# Patient Record
Sex: MALE | Race: BLACK OR AFRICAN AMERICAN
[De-identification: names, ages, dates, MRNs, and addresses within clinical notes are randomized per-mention and may not be internally consistent; named-entity substitution may affect disease eponyms.]

---

## 2015-02-19 VITALS
SYSTOLIC BLOOD PRESSURE: 124 MMHG | DIASTOLIC BLOOD PRESSURE: 62 MMHG | SYSTOLIC BLOOD PRESSURE: 124 MMHG | DIASTOLIC BLOOD PRESSURE: 62 MMHG | DIASTOLIC BLOOD PRESSURE: 62 MMHG | SYSTOLIC BLOOD PRESSURE: 124 MMHG | DIASTOLIC BLOOD PRESSURE: 62 MMHG | SYSTOLIC BLOOD PRESSURE: 124 MMHG

## 2018-08-09 ENCOUNTER — HOSPITAL ENCOUNTER (OUTPATIENT)
Dept: HOSPITAL 61 - US | Age: 58
Discharge: HOME | End: 2018-08-09
Attending: FAMILY MEDICINE
Payer: MEDICARE

## 2018-08-09 DIAGNOSIS — I10: ICD-10-CM

## 2018-08-09 DIAGNOSIS — E78.5: ICD-10-CM

## 2018-08-09 DIAGNOSIS — E11.9: ICD-10-CM

## 2018-08-09 DIAGNOSIS — H34.232: ICD-10-CM

## 2018-08-09 DIAGNOSIS — I65.23: Primary | ICD-10-CM

## 2018-08-09 PROCEDURE — 93880 EXTRACRANIAL BILAT STUDY: CPT

## 2018-08-10 ENCOUNTER — HOSPITAL ENCOUNTER (OUTPATIENT)
Dept: HOSPITAL 61 - ECHO | Age: 58
Discharge: HOME | End: 2018-08-10
Attending: INTERNAL MEDICINE
Payer: MEDICARE

## 2018-08-10 DIAGNOSIS — E78.5: ICD-10-CM

## 2018-08-10 DIAGNOSIS — R00.2: Primary | ICD-10-CM

## 2018-08-10 DIAGNOSIS — E11.9: ICD-10-CM

## 2018-08-10 DIAGNOSIS — I10: ICD-10-CM

## 2018-08-10 PROCEDURE — 93306 TTE W/DOPPLER COMPLETE: CPT

## 2019-03-11 ENCOUNTER — HOSPITAL ENCOUNTER (OUTPATIENT)
Dept: HOSPITAL 61 - US | Age: 59
Discharge: HOME | End: 2019-03-11
Attending: INTERNAL MEDICINE
Payer: MEDICAID

## 2019-03-11 DIAGNOSIS — I10: ICD-10-CM

## 2019-03-11 DIAGNOSIS — K76.0: Primary | ICD-10-CM

## 2019-03-11 PROCEDURE — 93975 VASCULAR STUDY: CPT

## 2019-03-11 NOTE — RAD
EXAM: Gray scale and color Doppler renal artery sonogram.

 

HISTORY: Hypertension.

 

TECHNIQUE: Gray scale and color Doppler sonographic images of kidneys and 

renal arteries with spectral analysis was performed.

 

COMPARISON: None.

 

FINDINGS: The right kidney measures 11.1 cm elzl-gi-gezx. The left kidney 

measures 13.2 cm psjg-iz-vsct. There is no hydronephrosis. There is a 5.6 

x 5.2 x 5.0 cm complex cyst within the lower pole the left kidney. This 

measured 4.9 x 4.7 x 4.7 cm on the study dated 2/11/2011.

 

The proximal renal arteries are obscured. The peak systolic velocity 

within the visualized right renal artery is 159 cm/s. The peak systolic 

velocity within the visualized left renal artery is 190 cm/s. There are 

normal renal artery to aorta velocity ratios. There is incidental hepatic 

steatosis.

 

IMPRESSION:

1. Elevated peak systolic velocity within the left renal artery, 

suggesting greater than 60 percent stenosis. There is no Doppler evidence 

of greater than 60 percent stenosis within the right renal artery. The 

evaluation is limited due to obscured proximal renal arteries.

2. 5.6 cm cyst with possible thin internal septation or debris within the 

lower pole the left kidney, slightly increased compared to a maximum 

measurement of 4.9 cm on the study dated 2/11/2011. The slow interval 

change over an 8 year period favors benignity.

 

Electronically signed by: Kyleigh White MD (3/11/2019 4:33 PM) [facility]

## 2021-09-23 ENCOUNTER — HOSPITAL ENCOUNTER (OUTPATIENT)
Dept: HOSPITAL 61 - NM | Age: 61
End: 2021-09-23
Attending: INTERNAL MEDICINE
Payer: MEDICARE

## 2021-09-23 DIAGNOSIS — I25.9: Primary | ICD-10-CM

## 2021-09-23 DIAGNOSIS — I25.10: ICD-10-CM

## 2021-09-23 PROCEDURE — 78452 HT MUSCLE IMAGE SPECT MULT: CPT

## 2021-09-23 PROCEDURE — 93017 CV STRESS TEST TRACING ONLY: CPT

## 2021-09-23 PROCEDURE — A9500 TC99M SESTAMIBI: HCPCS

## 2021-09-23 RX ADMIN — REGADENOSON ONE MG: 0.08 INJECTION, SOLUTION INTRAVENOUS at 09:33

## 2021-09-23 NOTE — RAD
MR#: [id number]

Account#: [id number]

Accession#: [id number]

Date of Study: 09/23/2021

Ordering Physician: TY MALONE, 

Referring Physician: JOSE MARTIN GARCIA Tech: RT Kristina (R) (N)





--------------- APPROVED REPORT --------------





Test Type:          Pharmacological

Stress Nurse/Tech: LELE ALSTON

Test Indications: CAD

Cardiac History: STENTS, HTN- SEE EMR

Medications:     SEE EMR

Medical History: SEE EMR

Resting ECG:     SB

Resting Heart Rate: 42 bpm

Resting Blood Pressure: 144/59mmHg

Pretest Chest Pain: No chest pain



Nurse/Tech Notes

S1,S2, LUNGS CTA, DENIED CHEST PAIN OR SOA. VSS.

Consent: The procedure was explained to the patient in lay terms. Informed consent was witnessed. Elliot
eout was entered into [employer]. History and Stress Test performed by JOSE A Carrillo, MENDEZ (R) 
(N)



Pharm. Details

Pharmacologic stress testing was performed using 0.4mg per 5ml of regadenoson given intravenously ove
r 7-10 seconds.



POST EXERCISE

Reason for Termination: Infusion complete

Max HR: 78 bpm

Max Blood Pressure: 172/65mmHg

Blood Pressure response to exercise: Normal blood pressure response during stress.

Heart Rate response to exercise: WNL

Chest Pain: No. 

Arrhythmia: No. 

ST Change: Yes. 



INTERPRETATION

Stress EKG Conclusion: Baseline EKG shows sinus rhythm.  Mild ST depressions inferolateral leads at p
eak stress suspicious but not diagnostic for ischemia.  No arrhythmias.



Imaging Protocol

IMAGE PROTOCOL: Rest Tc-99m/stress Tc-99m 1 day



Rest:            Stress:         Viability:   

Radiopharm.Tc99m AjutcdtyuOr71f Sestamibi

Dose10.2mCi            30mCi            

Duration    15min.           10min.           

Img Date  09/23/2021 09/23/2021      

Inj-Img [id number].           90min.           



Rest Admin Site:IV - Right AntecubitalAdministrator:JOSE A Carrillo, MENDEZ (R)(N)

Stress Admin Site: IV - Right AntecubitalAdministrator: JOSE A Carrillo, MENDEZ (R)(N)



STRESS DATA

End Diast. Vol.176.0mlAv. Heart Rate54.0bpm

End Syst. Vol.70.0mlCO Index BSA0.0L/min

Myocardial Pkvq771.0gEject. Yqtmuzit68.0%



Stress Rates

Pk. Fill Rate2.21EDV/secLVtime Pk. Fill 291.02msec

Pk. Empty Rate2.81ESV/secLVtime Pk. Rgxle757.09msec

1/3 Pk. Fill0.43EDV/sec



Stress Scores

Regional WT1.00Summed WT12.00

Regional WM0.00Summed WM1.00



LV Perfusion

Scintigraphic images showed moderate reversible defect involving the lateral wall consistent with isc
hemia.



Wall Motion

Normal left ventricle systolic function with ejection fraction calculated at 58%.



LV Perf. Quant

17 Seg. SSS15.00

17 Seg. SRS5.00

17 Seg. SDS10.00

Stress Defect Extent (% LAD)8.10Rest Defect Extent (% LAD)0.00Rev. Defect Extent (% LAD)8.10

Stress Defect Extent (% LCX) 95.00Rest Defect Extent (% LCX)62.50Rev. Defect Extent (% LCX)95.00


Stress Defect Extent (% RCA)0.00Rest Defect Extent (% RCA)0.00Rev. Defect Extent (% RCA)0.00

Stress Defect Extent (% SAVANA)24.60Rest Defect Extent (% SAVANA)10.90Rev. Defect Extent (% SAVANA)24.60



Conclusion

1. Regadenoson cardioisotope stress test showed moderate amount of lateral wall ischemia.

2. Normal left ventricular systolic function with ejection fraction calculated at 58%.

3. Intermediate risk for cardiac events.



Signed by : Azar Bates, 

Electronically Approved : 09/23/2021 16:46:09

## 2022-03-09 ENCOUNTER — HOSPITAL ENCOUNTER (OUTPATIENT)
Dept: HOSPITAL 61 - US | Age: 62
End: 2022-03-09
Attending: INTERNAL MEDICINE
Payer: MEDICARE

## 2022-03-09 DIAGNOSIS — N18.31: Primary | ICD-10-CM

## 2022-03-09 PROCEDURE — 76770 US EXAM ABDO BACK WALL COMP: CPT

## 2022-03-09 NOTE — RAD
EXAM: RENAL ULTRASOUND



CLINICAL HISTORY: Chronic kidney disease.



COMPARISON: 3/11/2019



TECHNIQUE: Ultrasound examination of the bilateral kidneys and urinary bladder was performed.



FINDINGS:



Right kidney: 10.6 cm longitudinal. Increased cortical echogenicity. Cortical thickness is within nor
mal limits. No hydronephrosis.



Left kidney: Measures 13 cm longitudinal. Increased cortical echogenicity. Cortical thickness is with
in normal limits. No hydronephrosis. Redemonstrated hypoechoic cyst at the inferior pole currently me
asured at 5.2 x 5 x 4 cm previously measured at 5.6 x 5.2 x 5 cm. Not seen previously is a complex cy
st at the interpolar region measuring approximately 1.6 x 2.4 x 1.2 cm.



No localized bladder wall thickness or layering debris.



IMPRESSION: 



1. The kidneys are within normal limits for size but exhibit increased cortical echogenicity often se
en with chronic medical renal disease. No hydronephrosis.

2. Complex renal cystic focus at the interpolar left kidney not seen previously and measured at 1.6 x
 1.2 x 2.4 cm. Follow-up ultrasound is recommended in 6 months. A hypoechoic more simple appearing cy
st off the inferior pole measures smaller in size from the prior at 5.2 x 5 x 4 cm compared to 5.6 x 
5.2 x 5 cm.

3. Unremarkable bladder.



Electronically signed by: LILIANE VASQUEZ MD (3/9/2022 5:06 PM) [id number]

## 2022-05-13 ENCOUNTER — HOSPITAL ENCOUNTER (OUTPATIENT)
Dept: HOSPITAL 61 - NM | Age: 62
End: 2022-05-13
Attending: INTERNAL MEDICINE
Payer: MEDICARE

## 2022-05-13 DIAGNOSIS — I25.10: Primary | ICD-10-CM

## 2022-05-13 PROCEDURE — 93017 CV STRESS TEST TRACING ONLY: CPT

## 2022-05-13 PROCEDURE — 78452 HT MUSCLE IMAGE SPECT MULT: CPT

## 2022-05-13 PROCEDURE — A9500 TC99M SESTAMIBI: HCPCS

## 2022-05-15 NOTE — RAD
MR#: [id number]

Account#: [id number]

Accession#: [id number]

Date of Study: 05/13/2022

Ordering Physician: TY MALONE, 

Referring Physician: JOSE MARTIN GARCIA Tech: RT MILLICENT Acevedo) (N)





--------------- APPROVED REPORT --------------





Test Type:          Pharmacological

Stress Nurse/Tech: Angela Garcia RN

Test Indications: CAD

Cardiac History: Hypertension, Diabetes,3 stents 2017

Medications:     See Electronic Medical Record

Medical History: See Electronic Medical Record

Resting ECG:     SB with BBB

Resting Heart Rate: 43 bpm

Resting Blood Pressure: 158/66mmHg

Pretest Chest Pain: Atypical angina



Nurse/Tech Notes

S1,S2 and lungs clear to auscultation. Patient complains of mid adbominal pain/pressure before study.


Consent: The procedure was explained to the patient in lay terms. Informed consent was witnessed. Elliot
eout was entered into [employer]. History and Stress Test performed by RT MILLICENT Acevedo) (N)



Pharm. Details

Pharmacologic stress testing was performed using 0.4mg per 5ml of regadenoson given intravenously ove
r 7-10 seconds.



Stress Symptoms

No chest pain or symptoms.



POST EXERCISE

Reason for Termination: Infusion complete

Target HR: No

Max HR: 71 bpm

52% of Maximum Predicted HR: 135 bpm

Max Blood Pressure: 152/58mmHg

Blood Pressure response to exercise: Normal blood pressure response during stress.

Heart Rate response to exercise: WNL

Chest Pain: No. 

Arrhythmia: No. 

ST Change: No. 



INTERPRETATION

Stress EKG Conclusion: No evidence of stress induced EKG changes.



Imaging Protocol

IMAGE PROTOCOL: Rest Tc-99m/stress Tc-99m 1 day



Rest:            Stress:         Viability:   

Radiopharm.Tc99m GdqonsytsTd85f Sestamibi

Dose10.2mCi            32mCi            

Duration    13min.           13min.           

Img Date  05/13/2022 05/13/2022      

Inj-Img Wjrq21ile.           60min.           



Rest Admin Site:IV - Right AntecubitalAdministrator:RT MILLICENT Acevedo)(N)

Stress Admin Site: IV - Right AntecubitalAdministrator: RT MILLICENT Acevedo)(N)



STRESS DATA

End Diast. Vol.183.0mlLVEDV index BSA90.0ml

End Syst. Vol.85.0mlLVESV index BSA41.0ml

Myocardial Arbm812.0gEject. Aqqndhek34.0%



Stress Scores

Regional WT3.00Summed WT37.00

Regional WM0.00Summed WM5.00



LV Perfusion

There is a large size severe intensity lateral wall perfusion defect which appears to be moderately r
eversible suggestive of prior infarct with noelle-infarct ischemia.



Wall Motion

Moderate global hypokinesis with moderate to severe lateral wall hypokinesis.



LV Perf. Quant

17 Seg. SSS20.00

17 Seg. SRS9.00

17 Seg. SDS11.00

Stress Defect Extent (% LAD)11.30Rest Defect Extent (% LAD)0.00Rev. Defect Extent (% LAD)5.60

Stress Defect Extent (% LCX) 100.00Rest Defect Extent (% LCX)80.00Rev. Defect Extent (% LCX)63.8
0

Stress Defect Extent (% RCA)0.00Rest Defect Extent (% RCA)0.00Rev. Defect Extent (% RCA)0.00

Stress Defect Extent (% SAVANA)31.30Rest Defect Extent (% SAVANA)15.40Rev. Defect Extent (% SAVANA)22.60



Other Information

Quality:Fair

Risk Assessment: Moderate-High Risk



Conclusion

1. No evidence of stress-induced EKG changes

2. Large severe intensity perfusion defect at stress with moderate reversibility suggestive of prior 
infarct with moderate noelle-infarct ischemia

3. Mild LV dysfunction, EF 50%

4. Degree of ischemia appears to be similar to prior study in 2021.

5. Moderate to high risk for future cardiovascular events



Signed by : Sudhakar Young, 

Electronically Approved : 05/15/2022 16:56:49